# Patient Record
Sex: MALE | Race: WHITE | NOT HISPANIC OR LATINO | Employment: STUDENT | URBAN - METROPOLITAN AREA
[De-identification: names, ages, dates, MRNs, and addresses within clinical notes are randomized per-mention and may not be internally consistent; named-entity substitution may affect disease eponyms.]

---

## 2017-07-12 ENCOUNTER — LAB VISIT (OUTPATIENT)
Dept: LAB | Facility: HOSPITAL | Age: 22
End: 2017-07-12
Payer: OTHER GOVERNMENT

## 2017-07-12 ENCOUNTER — OFFICE VISIT (OUTPATIENT)
Dept: INTERNAL MEDICINE | Facility: CLINIC | Age: 22
End: 2017-07-12
Payer: OTHER GOVERNMENT

## 2017-07-12 VITALS
WEIGHT: 142.63 LBS | HEIGHT: 68 IN | DIASTOLIC BLOOD PRESSURE: 61 MMHG | HEART RATE: 60 BPM | SYSTOLIC BLOOD PRESSURE: 118 MMHG | BODY MASS INDEX: 21.62 KG/M2 | TEMPERATURE: 99 F

## 2017-07-12 DIAGNOSIS — Z11.3 SCREEN FOR STD (SEXUALLY TRANSMITTED DISEASE): ICD-10-CM

## 2017-07-12 DIAGNOSIS — Z11.3 SCREEN FOR STD (SEXUALLY TRANSMITTED DISEASE): Primary | ICD-10-CM

## 2017-07-12 PROCEDURE — 86703 HIV-1/HIV-2 1 RESULT ANTBDY: CPT

## 2017-07-12 PROCEDURE — 99213 OFFICE O/P EST LOW 20 MIN: CPT | Mod: PBBFAC | Performed by: STUDENT IN AN ORGANIZED HEALTH CARE EDUCATION/TRAINING PROGRAM

## 2017-07-12 PROCEDURE — 99214 OFFICE O/P EST MOD 30 MIN: CPT | Mod: S$PBB,,, | Performed by: STUDENT IN AN ORGANIZED HEALTH CARE EDUCATION/TRAINING PROGRAM

## 2017-07-12 PROCEDURE — 86592 SYPHILIS TEST NON-TREP QUAL: CPT

## 2017-07-12 PROCEDURE — 36415 COLL VENOUS BLD VENIPUNCTURE: CPT

## 2017-07-12 PROCEDURE — 99999 PR PBB SHADOW E&M-EST. PATIENT-LVL III: CPT | Mod: PBBFAC,,, | Performed by: STUDENT IN AN ORGANIZED HEALTH CARE EDUCATION/TRAINING PROGRAM

## 2017-07-12 PROCEDURE — 87591 N.GONORRHOEAE DNA AMP PROB: CPT

## 2017-07-12 NOTE — PROGRESS NOTES
Subjective:       Patient ID: Manuel Sanchez is a 22 y.o. male.    Chief Complaint: Establish Care    22M presents for annual visit. He requests STD screening. He reports 4-5 female partners in the past year. Otherwise denies complaints. Reports intermittent urethritis.    Reports binge drinking 4-6 drinks on weekends. Completing a degree in Construction Management at [employer]. 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      Review of Systems   Constitutional: Negative for chills, fatigue and fever.   HENT: Negative for congestion and rhinorrhea.    Eyes: Negative for pain and redness.   Respiratory: Negative for cough, chest tightness, shortness of breath and wheezing.    Cardiovascular: Negative for chest pain, palpitations and leg swelling.   Gastrointestinal: Negative for abdominal pain, constipation, diarrhea, nausea and vomiting.   Endocrine: Negative for cold intolerance, heat intolerance, polydipsia and polyuria.   Genitourinary: Positive for dysuria. Negative for hematuria.   Musculoskeletal: Negative for arthralgias and myalgias.   Allergic/Immunologic: Negative for environmental allergies, food allergies and immunocompromised state.   Neurological: Negative for dizziness, light-headedness and headaches.   Hematological: Negative for adenopathy. Does not bruise/bleed easily.   Psychiatric/Behavioral: Negative for agitation and confusion.       Objective:      Physical Exam   Constitutional: He is oriented to person, place, and time. He appears well-developed and well-nourished. No distress.   HENT:   Head: Normocephalic and atraumatic.   Eyes: EOM are normal. Pupils are equal, round, and reactive to light.   Neck: Normal range of motion. Neck supple.   Cardiovascular: Normal rate, regular rhythm, normal heart sounds and intact distal pulses.  Exam reveals no gallop and no friction rub.    No murmur heard.  Pulmonary/Chest: Effort normal and breath sounds normal. No respiratory distress. He  exhibits no tenderness.   Abdominal: Soft. Bowel sounds are normal. He exhibits no distension. There is no tenderness.   Musculoskeletal: Normal range of motion. He exhibits no edema, tenderness or deformity.   Neurological: He is alert and oriented to person, place, and time. No cranial nerve deficit. Coordination normal.   Skin: Skin is warm and dry. No rash noted. He is not diaphoretic. No erythema. No pallor.   Psychiatric: He has a normal mood and affect. His behavior is normal. Judgment and thought content normal.   Nursing note and vitals reviewed.      Assessment:       1. Screen for STD (sexually transmitted disease)        Plan:       22M presents to establish care. Requests evaluation for STI.  - PMH, PSH, Allergies, Medications, , FH reviewed and updated in medical record.   - Healthcare maintenance brought up to date as detailed below. Screened for alcohol dependence and depression.  - Blood, urine screened for STDs as detailed below.    Manuel was seen today for establish care.    Diagnoses and all orders for this visit:    Screen for STD (sexually transmitted disease)  -     HIV-1 and HIV-2 antibodies; Future  -     RPR; Future  -     C. trachomatis/N. gonorrhoeae by AMP DNA Urine

## 2017-07-13 ENCOUNTER — TELEPHONE (OUTPATIENT)
Dept: INTERNAL MEDICINE | Facility: CLINIC | Age: 22
End: 2017-07-13

## 2017-07-13 LAB
C TRACH DNA SPEC QL NAA+PROBE: NOT DETECTED
HIV 1+2 AB+HIV1 P24 AG SERPL QL IA: NEGATIVE
N GONORRHOEA DNA SPEC QL NAA+PROBE: NOT DETECTED
RPR SER QL: NORMAL

## 2017-07-13 NOTE — PROGRESS NOTES
I have personally interviewed and examined this patient and agree with resident's note as below.   Sierra Bustos MD

## 2017-07-13 NOTE — TELEPHONE ENCOUNTER
Called home number [phone]. His mother answered and referred me to his mobile number [phone]. I called and left a voicemail informing him of his test results.

## 2018-06-03 ENCOUNTER — OFFICE VISIT (OUTPATIENT)
Dept: URGENT CARE | Facility: CLINIC | Age: 23
End: 2018-06-03
Payer: COMMERCIAL

## 2018-06-03 VITALS
HEART RATE: 86 BPM | WEIGHT: 125 LBS | SYSTOLIC BLOOD PRESSURE: 128 MMHG | OXYGEN SATURATION: 96 % | TEMPERATURE: 98 F | RESPIRATION RATE: 16 BRPM | BODY MASS INDEX: 19.62 KG/M2 | HEIGHT: 67 IN | DIASTOLIC BLOOD PRESSURE: 79 MMHG

## 2018-06-03 DIAGNOSIS — L73.9 FOLLICULITIS OF RIGHT AXILLA: ICD-10-CM

## 2018-06-03 DIAGNOSIS — L02.413 ABSCESS OF RIGHT ARM: Primary | ICD-10-CM

## 2018-06-03 PROCEDURE — 96372 THER/PROPH/DIAG INJ SC/IM: CPT | Mod: S$GLB,,, | Performed by: FAMILY MEDICINE

## 2018-06-03 PROCEDURE — 10060 I&D ABSCESS SIMPLE/SINGLE: CPT | Mod: S$GLB,,, | Performed by: FAMILY MEDICINE

## 2018-06-03 PROCEDURE — 99213 OFFICE O/P EST LOW 20 MIN: CPT | Mod: 25,S$GLB,, | Performed by: FAMILY MEDICINE

## 2018-06-03 RX ORDER — SULFAMETHOXAZOLE AND TRIMETHOPRIM 800; 160 MG/1; MG/1
1 TABLET ORAL 2 TIMES DAILY
Qty: 20 TABLET | Refills: 0 | Status: SHIPPED | OUTPATIENT
Start: 2018-06-03 | End: 2018-06-13

## 2018-06-03 RX ORDER — KETOROLAC TROMETHAMINE 10 MG/1
TABLET, FILM COATED ORAL
Qty: 20 TABLET | Refills: 0 | Status: SHIPPED | OUTPATIENT
Start: 2018-06-03 | End: 2018-06-13

## 2018-06-03 RX ORDER — KETOROLAC TROMETHAMINE 30 MG/ML
60 INJECTION, SOLUTION INTRAMUSCULAR; INTRAVENOUS
Status: COMPLETED | OUTPATIENT
Start: 2018-06-03 | End: 2018-06-03

## 2018-06-03 RX ADMIN — KETOROLAC TROMETHAMINE 60 MG: 30 INJECTION, SOLUTION INTRAMUSCULAR; INTRAVENOUS at 11:06

## 2018-06-03 NOTE — PROGRESS NOTES
"Subjective:       Patient ID: Manuel Sanchez is a 23 y.o. male.    Vitals:  height is 5' 7" (1.702 m) and weight is 56.7 kg (125 lb). His temperature is 97.5 °F (36.4 °C). His blood pressure is 128/79 and his pulse is 86. His respiration is 16 and oxygen saturation is 96%.     Chief Complaint: Abscess (POSSIBLE AXILLARY ABSCESS - LT SIDE)    C/o pain and swelling in right axilla  X 2 weeks, no drainage, no new deodorant        Abscess   Chronicity:  NewProgression Since Onset: worsening  Location:  Shoulder/arm  Associated Symptoms: no fever, no chills  Characteristics: painful and redness    Pain Scale:  9/10  Treatments Tried:  Nothing  Relieved by:  Nothing  Worsened by:  Nothing    Review of Systems   Constitution: Negative for chills and fever.   Eyes: Negative for discharge.   Skin: Negative for flushing and rash.        LT AXILLARY ABSCESS   Musculoskeletal: Negative for back pain.   Gastrointestinal: Negative for nausea and vomiting.   Genitourinary: Negative for dysuria, genital sores, hematuria and urgency.       Objective:      Physical Exam   Constitutional: He is oriented to person, place, and time. He appears well-developed and well-nourished. He is cooperative.  Non-toxic appearance. He does not appear ill. No distress.   HENT:   Head: Normocephalic and atraumatic.   Right Ear: Hearing, tympanic membrane, external ear and ear canal normal.   Left Ear: Hearing, tympanic membrane, external ear and ear canal normal.   Nose: Nose normal. No mucosal edema, rhinorrhea or nasal deformity. No epistaxis. Right sinus exhibits no maxillary sinus tenderness and no frontal sinus tenderness. Left sinus exhibits no maxillary sinus tenderness and no frontal sinus tenderness.   Mouth/Throat: Uvula is midline, oropharynx is clear and moist and mucous membranes are normal. No trismus in the jaw. Normal dentition. No uvula swelling. No oropharyngeal exudate or posterior oropharyngeal erythema.   Eyes: Conjunctivae and " lids are normal. Right eye exhibits no discharge. Left eye exhibits no discharge. No scleral icterus.   Sclera clear bilat   Neck: Trachea normal, normal range of motion, full passive range of motion without pain and phonation normal. Neck supple.   Cardiovascular: Normal rate, regular rhythm, normal heart sounds, intact distal pulses and normal pulses.    No murmur heard.  Pulmonary/Chest: Effort normal and breath sounds normal. He has no wheezes.   Abdominal: Soft. Normal appearance and bowel sounds are normal. He exhibits no distension, no pulsatile midline mass and no mass. There is no tenderness.   Musculoskeletal: Normal range of motion. He exhibits no edema or deformity.   Right axilla  With multiple tender abscess areas, no other LAP, raising of right arm with increase pain   Lymphadenopathy:     He has no cervical adenopathy.   Neurological: He is alert and oriented to person, place, and time. He exhibits normal muscle tone. Coordination normal.   Skin: Skin is warm, dry and intact. He is not diaphoretic. No pallor.   Psychiatric: He has a normal mood and affect. His speech is normal and behavior is normal. Judgment and thought content normal. Cognition and memory are normal.   Nursing note and vitals reviewed.      Assessment:       1. Abscess of right arm    2. Folliculitis of right axilla        Plan:         Abscess of right arm  -     sulfamethoxazole-trimethoprim 800-160mg (BACTRIM DS) 800-160 mg Tab; Take 1 tablet by mouth 2 (two) times daily.  Dispense: 20 tablet; Refill: 0  -     ketorolac injection 60 mg; Inject 2 mLs (60 mg total) into the muscle one time.  -     ketorolac (TORADOL) 10 mg tablet; Take one po q 8 hrs prn pain  Dispense: 20 tablet; Refill: 0  -     Ambulatory referral to General Surgery  -     INCISION AND DRAINAGE    Folliculitis of right axilla        Apply warm compresses,   Follow up with surgeon

## 2018-06-03 NOTE — PROCEDURES
"Incision & Drainage  Date/Time: 6/3/2018 11:05 AM  Performed by: ORALIA WEAVER  Authorized by: ORALIA WEAVER     Time out: Immediately prior to procedure a "time out" was called to verify the correct patient, procedure, equipment, support staff and site/side marked as required.    Consent Done?:  Yes (Verbal)    Type:  Abscess  Body area:  Upper extremity  Local anesthetic:  Lidocaine 1% without epinephrinelidocaine 1% without epinephrine  Anesthetic total (ml):  20  Risk factor:  Underlying major nerve  Incision type:  Single with marsupialization  Complexity:  Complex  Drainage:  Serosanguinous, pus and bloody  Drainage amount:  Scant  Wound treatment:  Incision, wound packed and deloculation  Packing material:  1/2 in gauze  Patient tolerance:  Patient tolerated the procedure well with no immediate complications      "

## 2018-06-05 ENCOUNTER — CLINICAL SUPPORT (OUTPATIENT)
Dept: URGENT CARE | Facility: CLINIC | Age: 23
End: 2018-06-05
Payer: COMMERCIAL

## 2018-06-05 VITALS
RESPIRATION RATE: 16 BRPM | TEMPERATURE: 98 F | HEART RATE: 67 BPM | SYSTOLIC BLOOD PRESSURE: 122 MMHG | OXYGEN SATURATION: 98 % | BODY MASS INDEX: 19.62 KG/M2 | WEIGHT: 125 LBS | HEIGHT: 67 IN | DIASTOLIC BLOOD PRESSURE: 78 MMHG

## 2018-06-05 DIAGNOSIS — Z51.89 ABSCESS RE-CHECK: Primary | ICD-10-CM

## 2018-06-05 PROCEDURE — 99499 UNLISTED E&M SERVICE: CPT | Mod: S$GLB,,, | Performed by: PHYSICIAN ASSISTANT

## 2018-06-05 NOTE — PROGRESS NOTES
"Subjective:       Patient ID: Manuel Sanchez is a 23 y.o. male.    Vitals:  height is 5' 7" (1.702 m) and weight is 56.7 kg (125 lb). His oral temperature is 98.3 °F (36.8 °C). His blood pressure is 122/78 and his pulse is 67. His respiration is 16 and oxygen saturation is 98%.     Chief Complaint: Wound Check (Abscess under left arm pit)    Patient states he is here for a wound check. Patient had an abscess under his left arm pit.      Wound Check   He was originally treated 2 to 3 days ago. Previous treatment included oral antibiotics and I&D of abscess. His temperature was unmeasured prior to arrival. The temperature was taken using an axillary reading. There has been no drainage from the wound. There is no redness present. There is no swelling present. The pain has not changed.     Review of Systems   Constitution: Negative for chills and fever.   HENT: Negative for sore throat.    Eyes: Negative for blurred vision.   Cardiovascular: Negative for chest pain.   Respiratory: Negative for shortness of breath.    Skin: Negative for rash.   Musculoskeletal: Negative for back pain and joint pain.   Gastrointestinal: Negative for abdominal pain, diarrhea, nausea and vomiting.   Neurological: Negative for headaches.   Psychiatric/Behavioral: The patient is not nervous/anxious.        Objective:      Physical Exam   Constitutional: He is oriented to person, place, and time. He appears well-developed and well-nourished.   HENT:   Head: Normocephalic and atraumatic. Head is without abrasion, without contusion and without laceration.   Right Ear: External ear normal.   Left Ear: External ear normal.   Nose: Nose normal.   Mouth/Throat: Oropharynx is clear and moist.   Eyes: Conjunctivae, EOM and lids are normal. Pupils are equal, round, and reactive to light.   Neck: Trachea normal, full passive range of motion without pain and phonation normal. Neck supple.   Cardiovascular: Normal rate, regular rhythm and normal heart " sounds.    Pulmonary/Chest: Effort normal and breath sounds normal. No stridor. No respiratory distress.   Musculoskeletal: Normal range of motion.   Neurological: He is alert and oriented to person, place, and time.   Skin: Skin is warm, dry and intact. Capillary refill takes less than 2 seconds. Lesion noted. No abrasion, no bruising, no burn, no ecchymosis, no laceration and no rash noted. No erythema.        Well healing abscess. Packing removed. No signs of active drainage. No residual edema or erythema.    Psychiatric: He has a normal mood and affect. His speech is normal and behavior is normal. Judgment and thought content normal. Cognition and memory are normal.   Nursing note and vitals reviewed.      Assessment:       1. Abscess re-check        Plan:         Abscess re-check        Wound Care After Packing Removal or Replacement  Packing is a special type of dressing placed inside a wound to help it heal. Once the packing is removed, you need to care for your wound. Good wound care helps prevent infection. Be sure to keep all follow-up appointments with your healthcare provider. Follow these instructions to take care of the wound once youre at home.  Home care  Your healthcare provider may prescribe medicines for pain. Or he or she may suggest an over-the-counter (OTC) pain reliever, such as ibuprofen or acetaminophen. Talk with your healthcare provider before taking any OTC medicines if you have chronic liver or kidney disease, a stomach ulcer, or gastrointestinal bleeding. In certain cases, you may also need to take antibiotics to help prevent infection. If so, take them exactly as directed for as long as directed. Dont stop taking your antibiotics until they are all gone, even if you feel better.  Here are some general care guidelines for your wound:  · Follow your healthcare providers instructions on how to care for your wound. Always wash your hands with soap and warm water before and after tending  to your wound.  · If a bandage was put on, remove and change it once a day or as directed. If the bandage gets wet or dirty, replace it as soon as possible with a new bandage. Use a clean cloth to gently pat the wound dry.  · If your packing was replaced, a small piece of gauze may hang from the wound. It allows fluid to continue draining from the wound. You may need to use an ointment or cream to keep the packing from sticking to the bandage.  · Don't bathe in a tub or soak your wound until your healthcare provider says its OK. Take showers or sponge baths instead. Avoid swimming.  · Dont scratch, rub, scrub, or pick your wound.  · Check your wound daily for the signs of infection listed below.  If your wound was closed, it was likely with one of four types of closures. These include stitches (sutures), strips of surgical tape, skin glue, and staples. Your healthcare provider will decide on the best closure based on the size and location of your wound. Each type of closure needs specific care.  · Sutures. You may want to clean the wound daily after the first 2 to 3 days. To do this, remove the bandage and gently wash the area with soap and warm water. After cleaning, apply a thin layer of antibiotic ointment if recommended. Then put on a new bandage. Sutures on the outside of the skin usually need to be removed by your healthcare provider.  · Surgical tape. Keep the area dry. If it gets wet, blot it dry with a towel. Surgical tape closures usually fall off within 7 to 10 days. If they have not fallen off after 10 days, you can remove them yourself. To remove the tape, use mineral oil or petroleum jelly on a cotton ball to gently rub the adhesive.  · Skin glue. You may shower or bathe as usual. But do not use soaps, lotions, or ointments on the wound area. Do not scrub the wound. After bathing, pat the wound dry with a soft towel. Do not apply liquids like peroxide, ointments, or creams to the wound while the  strips or film is in place. Don't scratch, rub, or pick at the strips or film. Don't put tape directly over the strips or film. Skin adhesive film will fall off naturally in 5 to 10 days. If it does not peel off in 10 days, gently rub petroleum jelly or an ointment onto the film.  · Staples. Take showers or sponge baths. Don't take tub baths. Don't use lotions on the wound area. The area may be cleaned with soap and water 2 to 3 days after the wound was stapled. Don't scrub the wound. Pat it dry with a clean soft cloth or towel. You can use antibiotic ointment if your healthcare provider tells you to. Staples will need to be removed in 10 to 14 days.  Follow-up care  Follow up with your healthcare provider, or as directed. If your packing was replaced, you may need another visit within 1 to 3 days to remove or replace it. If you have sutures or staples, return for their removal as directed.  When to seek medical advice  Call your health care provider right away if you have signs of infection:  · Fever of 1 degree or more above your normal temperature, or as directed by your healthcare provider  · Increasing pain in the wound or pain that doesnt get better even with pain medicine  · Increasing redness or swelling  · Pus or bad-smelling drainage from the wound  Also call your healthcare provider right away if any of these occur:  · Your wound bleeds more than a small amount or wont stop bleeding.  · The edges of your wound come apart.  · You have numbness or weakness in the wound area that doesnt go away.  Date Last Reviewed: 10/2/2015  © 4595-3172 [employer]. [address] [place] [address]. All rights reserved. This information is not intended as a substitute for professional medical care. Always follow your healthcare professional's instructions.      Please follow up with your Primary care provider within 2-5 days if your signs and symptoms have not resolved or worsen.     If your  condition worsens or fails to improve we recommend that you receive another evaluation at the emergency room immediately or contact your primary medical clinic to discuss your concerns.   You must understand that you have received an Urgent Care treatment only and that you may be released before all of your medical problems are known or treated. You, the patient, will arrange for follow up care as instructed.

## 2018-06-05 NOTE — PATIENT INSTRUCTIONS
Wound Care After Packing Removal or Replacement  Packing is a special type of dressing placed inside a wound to help it heal. Once the packing is removed, you need to care for your wound. Good wound care helps prevent infection. Be sure to keep all follow-up appointments with your healthcare provider. Follow these instructions to take care of the wound once youre at home.  Home care  Your healthcare provider may prescribe medicines for pain. Or he or she may suggest an over-the-counter (OTC) pain reliever, such as ibuprofen or acetaminophen. Talk with your healthcare provider before taking any OTC medicines if you have chronic liver or kidney disease, a stomach ulcer, or gastrointestinal bleeding. In certain cases, you may also need to take antibiotics to help prevent infection. If so, take them exactly as directed for as long as directed. Dont stop taking your antibiotics until they are all gone, even if you feel better.  Here are some general care guidelines for your wound:  · Follow your healthcare providers instructions on how to care for your wound. Always wash your hands with soap and warm water before and after tending to your wound.  · If a bandage was put on, remove and change it once a day or as directed. If the bandage gets wet or dirty, replace it as soon as possible with a new bandage. Use a clean cloth to gently pat the wound dry.  · If your packing was replaced, a small piece of gauze may hang from the wound. It allows fluid to continue draining from the wound. You may need to use an ointment or cream to keep the packing from sticking to the bandage.  · Don't bathe in a tub or soak your wound until your healthcare provider says its OK. Take showers or sponge baths instead. Avoid swimming.  · Dont scratch, rub, scrub, or pick your wound.  · Check your wound daily for the signs of infection listed below.  If your wound was closed, it was likely with one of four types of closures. These include  stitches (sutures), strips of surgical tape, skin glue, and staples. Your healthcare provider will decide on the best closure based on the size and location of your wound. Each type of closure needs specific care.  · Sutures. You may want to clean the wound daily after the first 2 to 3 days. To do this, remove the bandage and gently wash the area with soap and warm water. After cleaning, apply a thin layer of antibiotic ointment if recommended. Then put on a new bandage. Sutures on the outside of the skin usually need to be removed by your healthcare provider.  · Surgical tape. Keep the area dry. If it gets wet, blot it dry with a towel. Surgical tape closures usually fall off within 7 to 10 days. If they have not fallen off after 10 days, you can remove them yourself. To remove the tape, use mineral oil or petroleum jelly on a cotton ball to gently rub the adhesive.  · Skin glue. You may shower or bathe as usual. But do not use soaps, lotions, or ointments on the wound area. Do not scrub the wound. After bathing, pat the wound dry with a soft towel. Do not apply liquids like peroxide, ointments, or creams to the wound while the strips or film is in place. Don't scratch, rub, or pick at the strips or film. Don't put tape directly over the strips or film. Skin adhesive film will fall off naturally in 5 to 10 days. If it does not peel off in 10 days, gently rub petroleum jelly or an ointment onto the film.  · Staples. Take showers or sponge baths. Don't take tub baths. Don't use lotions on the wound area. The area may be cleaned with soap and water 2 to 3 days after the wound was stapled. Don't scrub the wound. Pat it dry with a clean soft cloth or towel. You can use antibiotic ointment if your healthcare provider tells you to. Staples will need to be removed in 10 to 14 days.  Follow-up care  Follow up with your healthcare provider, or as directed. If your packing was replaced, you may need another visit within 1 to  3 days to remove or replace it. If you have sutures or staples, return for their removal as directed.  When to seek medical advice  Call your health care provider right away if you have signs of infection:  · Fever of 1 degree or more above your normal temperature, or as directed by your healthcare provider  · Increasing pain in the wound or pain that doesnt get better even with pain medicine  · Increasing redness or swelling  · Pus or bad-smelling drainage from the wound  Also call your healthcare provider right away if any of these occur:  · Your wound bleeds more than a small amount or wont stop bleeding.  · The edges of your wound come apart.  · You have numbness or weakness in the wound area that doesnt go away.  Date Last Reviewed: 10/2/2015  © 4969-6958 [employer]. [address] [place] [address], [place] [address]. All rights reserved. This information is not intended as a substitute for professional medical care. Always follow your healthcare professional's instructions.      Please follow up with your Primary care provider within 2-5 days if your signs and symptoms have not resolved or worsen.     If your condition worsens or fails to improve we recommend that you receive another evaluation at the emergency room immediately or contact your primary medical clinic to discuss your concerns.   You must understand that you have received an Urgent Care treatment only and that you may be released before all of your medical problems are known or treated. You, the patient, will arrange for follow up care as instructed.

## 2018-06-07 ENCOUNTER — OFFICE VISIT (OUTPATIENT)
Dept: SURGERY | Facility: CLINIC | Age: 23
End: 2018-06-07
Payer: COMMERCIAL

## 2018-06-07 VITALS
WEIGHT: 132.06 LBS | DIASTOLIC BLOOD PRESSURE: 73 MMHG | HEART RATE: 67 BPM | HEIGHT: 67 IN | SYSTOLIC BLOOD PRESSURE: 107 MMHG | BODY MASS INDEX: 20.73 KG/M2 | TEMPERATURE: 98 F

## 2018-06-07 DIAGNOSIS — L02.419 AXILLARY ABSCESS: ICD-10-CM

## 2018-06-07 PROCEDURE — 99999 PR PBB SHADOW E&M-EST. PATIENT-LVL III: CPT | Mod: PBBFAC,,, | Performed by: STUDENT IN AN ORGANIZED HEALTH CARE EDUCATION/TRAINING PROGRAM

## 2018-06-07 PROCEDURE — 3008F BODY MASS INDEX DOCD: CPT | Mod: CPTII,S$GLB,, | Performed by: STUDENT IN AN ORGANIZED HEALTH CARE EDUCATION/TRAINING PROGRAM

## 2018-06-07 PROCEDURE — 99204 OFFICE O/P NEW MOD 45 MIN: CPT | Mod: S$GLB,,, | Performed by: STUDENT IN AN ORGANIZED HEALTH CARE EDUCATION/TRAINING PROGRAM

## 2018-06-07 NOTE — LETTER
June 7, 2018      Julius Butts MD  [address] [place] [address]           [facility]  [address]  [address]th [address] [facility] [address]  Phone: [phone]          Patient: Manuel Sanchez   MR Number: [id number]   YOB: 1995   Date of Visit: 6/7/2018       Dear Dr. Julius Butts:    Thank you for referring Manuel Sanchez to me for evaluation. Attached you will find relevant portions of my assessment and plan of care.    If you have questions, please do not hesitate to call me. I look forward to following Manuel Sanchez along with you.    Sincerely,    Junior Nelson MD    Enclosure  CC:  No Recipients    If you would like to receive this communication electronically, please contact [email or web address] or [phone] to request more information on [employer] access.    For providers and/or their staff who would like to refer a patient to [facility], please contact us through our one-stop-shop provider referral line, [facility], at [phone].    If you feel you have received this communication in error or would no longer like to receive these types of communications, please e-mail [email or web address]

## 2018-06-07 NOTE — PROGRESS NOTES
Subjective:       Patient ID: Manuel Sanchez is a 23 y.o. male.    Chief Complaint: No chief complaint on file.    Patient is a 23M with a past medical history of left axilla cysts and folliculitis who presented to urgent care on 6/3 for presence of cysts with swelling, pain, and decreased range of motion. Urgent care performed excision and drainage of the cysts, placed the patient on bactrim, and referred him to general surgery for follow up care. The patient first noticed the irregularities about 3-4 weeks ago and they become increasingly inflamed and painful. The patient has no history of a similar condition and no fever, chill, or diaphoresis. The cysts are healing well are there is no discharge, or pain, and range of motion has returned.       Review of Systems   Constitutional: Negative.    HENT: Negative.    Eyes: Negative.    Respiratory: Negative.    Cardiovascular: Negative.    Gastrointestinal: Negative.    Endocrine: Negative.    Genitourinary: Negative.    Musculoskeletal: Negative.    Skin: Positive for color change.   Allergic/Immunologic: Negative.    Neurological: Negative.    Hematological: Negative.    Psychiatric/Behavioral: Negative.        Objective:      Physical Exam   Constitutional: He is oriented to person, place, and time. He appears well-developed and well-nourished.   HENT:   Head: Normocephalic and atraumatic.   Eyes: EOM are normal. Pupils are equal, round, and reactive to light.   Neck: Normal range of motion.   Cardiovascular: Normal rate and regular rhythm.    Pulmonary/Chest: Effort normal and breath sounds normal.   Abdominal: Soft.   Musculoskeletal: Normal range of motion.        Arms:  Neurological: He is alert and oriented to person, place, and time.   Skin: Skin is warm and dry.       Assessment:       No diagnosis found.    Plan:       Left axillary abscess s/p I&D  - patient is currently on bactrim   - follow up in one week once course of bactrim is completed   - if  swelling still present or if there is a return of symptoms then U/S can be performed

## 2018-06-13 ENCOUNTER — OFFICE VISIT (OUTPATIENT)
Dept: FAMILY MEDICINE | Facility: CLINIC | Age: 23
End: 2018-06-13
Payer: COMMERCIAL

## 2018-06-13 VITALS
BODY MASS INDEX: 21.35 KG/M2 | RESPIRATION RATE: 18 BRPM | HEIGHT: 67 IN | HEART RATE: 67 BPM | DIASTOLIC BLOOD PRESSURE: 62 MMHG | TEMPERATURE: 98 F | OXYGEN SATURATION: 99 % | SYSTOLIC BLOOD PRESSURE: 98 MMHG | WEIGHT: 136 LBS

## 2018-06-13 DIAGNOSIS — Z20.2 EXPOSURE TO STD: Primary | ICD-10-CM

## 2018-06-13 DIAGNOSIS — Z00.00 ANNUAL PHYSICAL EXAM: ICD-10-CM

## 2018-06-13 DIAGNOSIS — B35.3 TINEA PEDIS OF BOTH FEET: ICD-10-CM

## 2018-06-13 PROCEDURE — 99214 OFFICE O/P EST MOD 30 MIN: CPT | Mod: S$GLB,,, | Performed by: FAMILY MEDICINE

## 2018-06-13 PROCEDURE — 99999 PR PBB SHADOW E&M-EST. PATIENT-LVL IV: CPT | Mod: PBBFAC,,, | Performed by: FAMILY MEDICINE

## 2018-06-13 PROCEDURE — 3008F BODY MASS INDEX DOCD: CPT | Mod: CPTII,S$GLB,, | Performed by: FAMILY MEDICINE

## 2018-06-13 RX ORDER — SULCONAZOLE NITRATE 10 MG/G
CREAM TOPICAL DAILY
Qty: 60 G | Refills: 2 | Status: SHIPPED | OUTPATIENT
Start: 2018-06-13 | End: 2019-04-26

## 2018-06-13 NOTE — PROGRESS NOTES
HPI:  Manuel Sanchez is a 23 y.o. year old male that  Presents for annual exam . He has been having issues wit his feet for years.They itch and peel . He has tried over the counter foot fungus cream but only uses it twice a month. He wears boots daily.  Chief Complaint   Patient presents with    Annual Exam    Establish Care     pt would like to have STD testing     Athletes Foot   .     HPI      Past Medical History:   Diagnosis Date    Color blindness, congenital      Social History     Social History    Marital status: Single     Spouse name: N/A    Number of children: N/A    Years of education: N/A     Occupational History    Not on file.     Social History Main Topics    Smoking status: Former Smoker     Packs/day: 0.25     Types: Cigarettes     Start date: 9/5/2016     Quit date: 6/19/2017    Smokeless tobacco: Former User     Types: Chew    Alcohol use 2.4 - 3.6 oz/week     4 - 6 Glasses of wine per week    Drug use: No      Comment: former    Sexual activity: Yes     Partners: Female     Birth control/ protection: Condom, OCP     Other Topics Concern    Not on file     Social History Narrative    Pt lives with mother and sister.        Pt attends [employer], 12th grade.        2 cats     Past Surgical History:   Procedure Laterality Date    ORIF FOREARM FRACTURE Right 2010    R subacromial smoothing Right 2012    TONSILLECTOMY Bilateral 2001     Family History   Problem Relation Age of Onset    Blindness Maternal Uncle     Cancer Maternal Grandmother     Celiac disease Mother     Diabetes Father     Celiac disease Sister     Amblyopia Neg Hx     Cataracts Neg Hx     Glaucoma Neg Hx     Macular degeneration Neg Hx     Retinal detachment Neg Hx     Strabismus Neg Hx            Review of Systems  General ROS: negative for chills, fever or weight loss  ENT ROS: negative for epistaxis, sore throat or rhinorrhea  Respiratory ROS: no cough, shortness of breath, or  "wheezing  Cardiovascular ROS: no chest pain or dyspnea on exertion  Gastrointestinal ROS: no abdominal pain, change in bowel habits, or black/ bloody stools  Skin: skin peeling on feet    Physical Exam:  BP 98/62   Pulse 67   Temp 97.6 °F (36.4 °C) (Oral)   Resp 18   Ht 5' 7" (1.702 m)   Wt 61.7 kg (136 lb)   SpO2 99%   BMI 21.30 kg/m²   General appearance: alert, cooperative, no distress  Constitutional:Oriented to person, place, and time.appears well-developed and well-nourished.  Skin: bilateral feet, peel ing on dorsum , no open lesions   Physical Exam    LABS:    Complete Blood Count  Lab Results   Component Value Date    RBC 5.04 07/16/2012    HGB 15.4 07/16/2012    HCT 45.3 07/16/2012    MCV 89.9 07/16/2012    MCH 30.4 07/16/2012    MCHC 33.8 07/16/2012    RDW 12.2 07/16/2012     07/16/2012    MPV 8.4 (L) 07/16/2012    GRAN 2.9 07/16/2012    GRAN 49.8 07/16/2012    LYMPH 37.0 07/16/2012    LYMPH 2.1 07/16/2012    MONO 11.2 (H) 07/16/2012    MONO 0.6 07/16/2012    EOS 0.1 07/16/2012    BASO 0.0 07/16/2012    EOSINOPHIL 1.4 07/16/2012    BASOPHIL 0.6 07/16/2012       Comprehensive Metabolic Panel  No results found for: GLU, BUN, CREATININE, NA, K, CL, PROT, ALBUMIN, BILITOT, AST, ALKPHOS, CO2, ALT, ANIONGAP, EGFRNONAA, ESTGFRAFRICA    LIPID  Lab Results   Component Value Date    CHOL 141 03/23/2009    HDL 45 03/23/2009         TSH  No results found for: TSH    Current Outpatient Prescriptions   Medication Sig Dispense Refill    sulfamethoxazole-trimethoprim 800-160mg (BACTRIM DS) 800-160 mg Tab Take 1 tablet by mouth 2 (two) times daily. 20 tablet 0    sulconazole (EXELDERM) 1 % cream Apply topically once daily. 60 g 2     No current facility-administered medications for this visit.        Assessment:    ICD-10-CM ICD-9-CM    1. Exposure to STD Z20.2 V01.6 Hepatitis panel, acute      HIV-1 and HIV-2 antibodies      RPR      C. trachomatis/N. gonorrhoeae by AMP DNA Urine         2. Tinea pedis of " both feet B35.3 110.4 sulconazole (EXELDERM) 1 % cream   3. Annual physical exam Z00.00 V70.0 Comprehensive metabolic panel      Lipid panel      CBC auto differential      TSH         Plan:    Follow-up in 4 weeks (on 7/10/2018).          Carmen Felton MD

## 2018-06-14 ENCOUNTER — TELEPHONE (OUTPATIENT)
Dept: SURGERY | Facility: CLINIC | Age: 23
End: 2018-06-14

## 2018-06-14 NOTE — TELEPHONE ENCOUNTER
06/14/2018      1122  Attempted to contact pt regarding rescheduling his appt on tomorrow due to Dr. Nelson having to go to surgery. No answer. Left voicemail.

## 2018-06-23 ENCOUNTER — LAB VISIT (OUTPATIENT)
Dept: LAB | Facility: HOSPITAL | Age: 23
End: 2018-06-23
Attending: FAMILY MEDICINE
Payer: COMMERCIAL

## 2018-06-23 DIAGNOSIS — Z20.2 EXPOSURE TO STD: ICD-10-CM

## 2018-06-23 LAB
C TRACH DNA SPEC QL NAA+PROBE: NOT DETECTED
N GONORRHOEA DNA SPEC QL NAA+PROBE: NOT DETECTED

## 2018-06-23 PROCEDURE — 87491 CHLMYD TRACH DNA AMP PROBE: CPT

## 2019-04-26 ENCOUNTER — OFFICE VISIT (OUTPATIENT)
Dept: URGENT CARE | Facility: CLINIC | Age: 24
End: 2019-04-26
Payer: COMMERCIAL

## 2019-04-26 VITALS
TEMPERATURE: 98 F | BODY MASS INDEX: 19.62 KG/M2 | WEIGHT: 125 LBS | SYSTOLIC BLOOD PRESSURE: 103 MMHG | DIASTOLIC BLOOD PRESSURE: 64 MMHG | OXYGEN SATURATION: 99 % | HEART RATE: 62 BPM | HEIGHT: 67 IN | RESPIRATION RATE: 18 BRPM

## 2019-04-26 DIAGNOSIS — B36.0 TINEA VERSICOLOR: Primary | ICD-10-CM

## 2019-04-26 PROCEDURE — 3008F BODY MASS INDEX DOCD: CPT | Mod: CPTII,S$GLB,, | Performed by: PHYSICIAN ASSISTANT

## 2019-04-26 PROCEDURE — 99214 PR OFFICE/OUTPT VISIT, EST, LEVL IV, 30-39 MIN: ICD-10-PCS | Mod: S$GLB,,, | Performed by: PHYSICIAN ASSISTANT

## 2019-04-26 PROCEDURE — 99214 OFFICE O/P EST MOD 30 MIN: CPT | Mod: S$GLB,,, | Performed by: PHYSICIAN ASSISTANT

## 2019-04-26 PROCEDURE — 3008F PR BODY MASS INDEX (BMI) DOCUMENTED: ICD-10-PCS | Mod: CPTII,S$GLB,, | Performed by: PHYSICIAN ASSISTANT

## 2019-04-26 RX ORDER — KETOCONAZOLE 20 MG/ML
SHAMPOO, SUSPENSION TOPICAL
Qty: 120 ML | Refills: 0 | Status: SHIPPED | OUTPATIENT
Start: 2019-04-29 | End: 2019-05-13

## 2019-04-26 RX ORDER — CLOTRIMAZOLE 1 %
CREAM (GRAM) TOPICAL
Qty: 30 G | Refills: 2 | Status: SHIPPED | OUTPATIENT
Start: 2019-04-26 | End: 2019-05-29

## 2019-04-26 NOTE — PATIENT INSTRUCTIONS
-Wash body with antifungal shampoo twice weekly for 2 weeks.  -Apply antifungal cream twice daily for 2 weeks.    Please follow up with your primary care provider within 2-5 days if your signs and symptoms have not resolved or worsen.     If your condition worsens or fails to improve we recommend that you receive another evaluation at the emergency room immediately or contact your primary medical clinic to discuss your concerns.   You must understand that you have received an Urgent Care treatment only and that you may be released before all of your medical problems are known or treated. You, the patient, will arrange for follow up care as instructed.         Tinea Versicolor  This is a rash caused by a fungus in the top layers of the skin. This fungus is normally present in the pores of the skin and causes no symptoms. But when the fungus overgrows it causes a rash. The fungus grows more easily in hot climates, and on oily or sweaty skin. Health experts dont know why some people get this rash and others dont. Experts also dont know why the rash will suddenly appear in someone who has never had it before.  The rash is made up of irregular pale or tan spots and patches. The rash is usually on the neck, upper back, chest, and shoulders. You may have mild itching, especially if you become overheated. But it doesn't cause other symptoms. Because these spots don't change color with sun exposure like normal skin, the rash may be lighter or darker than your normal skin.  This rash is harmless and usually causes no symptoms. The only reason for treatment is to improve appearance. Follow the advice below to clear the rash. It might take several months for normal skin color to return.  Home care  · Use a medicated dandruff shampoo over your whole body while in the shower. Dont use soap. Let the shampoo stay on for at least a few minutes before rinsing off. Do this every day for 4 weeks.  · As a different treatment, you  may buy an antifungal cream (miconazole or clotrimazole, both available without a prescription). Use this 2 times a day for 7 days.   · This rash is not contagious to others. It cant be spread if someone touches it. So you dont have to worry about exposing others at school, , or work.  Prevention  This fungus can come back again (recur) after treatment. To prevent return of the rash, use medicated dandruff shampoo over your whole body when in the shower. Do this once a month for the next year. This is very important to do in the summertime. That is when the rash is most likely to recur.  Other prevention tips include:  · Avoid oily skin products  · Wear loose clothing. Try to let your skin stay cool and breathe.  · Use sunscreen and protect yourself from sunlight  · Avoid tanning beds  Follow-up care  Follow up with your healthcare provider, or as advised. Call your provider if the rash doesnt get better with the above treatment, or if new symptoms appear.  When to seek medical advice  Call your healthcare provider right away if any of these occur:  · Increasing redness of the rash  · Change in appearance of the rash  · Fever of 100.4ºF (38ºC) or higher, or as directed by your provider  Date Last Reviewed: 8/1/2016  © 6321-5280 [employer]. [address] [place] [address], [place] [address]. All rights reserved. This information is not intended as a substitute for professional medical care. Always follow your healthcare professional's instructions.

## 2019-04-26 NOTE — PROGRESS NOTES
"Subjective:       Patient ID: Manuel Sanchez is a 24 y.o. male.    Vitals:  height is 5' 7" (1.702 m) and weight is 56.7 kg (125 lb). His oral temperature is 98.1 °F (36.7 °C). His blood pressure is 103/64 and his pulse is 62. His respiration is 18 and oxygen saturation is 99%.     Chief Complaint: Rash    Rash   This is a new problem. Episode onset: 2 weeks. The problem has been gradually worsening since onset. The affected locations include the chest. Pertinent negatives include no cough, fever or sore throat. Past treatments include nothing.       Constitution: Negative for chills and fever.   HENT: Negative for facial swelling and sore throat.    Neck: Negative for painful lymph nodes.   Eyes: Negative for eye itching and eyelid swelling.   Respiratory: Negative for cough.    Musculoskeletal: Negative for joint pain and joint swelling.   Skin: Positive for rash and erythema. Negative for color change, pale, wound, abrasion, laceration, lesion, skin thickening/induration, puncture wound, abscess, avulsion and hives.   Allergic/Immunologic: Negative for environmental allergies, immunocompromised state and hives.   Hematologic/Lymphatic: Negative for swollen lymph nodes.       Objective:      Physical Exam   Constitutional: He is oriented to person, place, and time. Vital signs are normal. He appears well-developed and well-nourished. He does not appear ill. No distress.   HENT:   Head: Normocephalic and atraumatic.   Right Ear: External ear normal.   Left Ear: External ear normal.   Nose: Nose normal.   Eyes: Conjunctivae, EOM and lids are normal. Right eye exhibits no discharge. Left eye exhibits no discharge.   Neck: Normal range of motion. Neck supple.   Cardiovascular: Normal rate, regular rhythm and normal heart sounds. Exam reveals no gallop and no friction rub.   No murmur heard.  Pulmonary/Chest: Effort normal and breath sounds normal. No stridor. No respiratory distress. He has no decreased breath " sounds. He has no wheezes. He has no rhonchi. He has no rales.   Musculoskeletal: Normal range of motion.   Neurological: He is alert and oriented to person, place, and time.   Skin: Skin is warm and dry. Rash noted. He is not diaphoretic. There is erythema. No pallor.   Erythematous annular patches on chest with central clearing; hypopigmented areas of upper back   Psychiatric: He has a normal mood and affect. His behavior is normal.   Nursing note and vitals reviewed.                Assessment:       1. Tinea versicolor        Plan:         Tinea versicolor  -     clotrimazole (LOTRIMIN) 1 % cream; Apply to affected area 2 times daily for 2 weeks.  Dispense: 30 g; Refill: 2  -     ketoconazole (NIZORAL) 2 % shampoo; Apply topically twice a week. for 14 days  Dispense: 120 mL; Refill: 0      Patient Instructions   -Wash body with antifungal shampoo twice weekly for 2 weeks.  -Apply antifungal cream twice daily for 2 weeks.    Please follow up with your primary care provider within 2-5 days if your signs and symptoms have not resolved or worsen.     If your condition worsens or fails to improve we recommend that you receive another evaluation at the emergency room immediately or contact your primary medical clinic to discuss your concerns.   You must understand that you have received an Urgent Care treatment only and that you may be released before all of your medical problems are known or treated. You, the patient, will arrange for follow up care as instructed.         Tinea Versicolor  This is a rash caused by a fungus in the top layers of the skin. This fungus is normally present in the pores of the skin and causes no symptoms. But when the fungus overgrows it causes a rash. The fungus grows more easily in hot climates, and on oily or sweaty skin. Health experts dont know why some people get this rash and others dont. Experts also dont know why the rash will suddenly appear in someone who has never had it  before.  The rash is made up of irregular pale or tan spots and patches. The rash is usually on the neck, upper back, chest, and shoulders. You may have mild itching, especially if you become overheated. But it doesn't cause other symptoms. Because these spots don't change color with sun exposure like normal skin, the rash may be lighter or darker than your normal skin.  This rash is harmless and usually causes no symptoms. The only reason for treatment is to improve appearance. Follow the advice below to clear the rash. It might take several months for normal skin color to return.  Home care  · Use a medicated dandruff shampoo over your whole body while in the shower. Dont use soap. Let the shampoo stay on for at least a few minutes before rinsing off. Do this every day for 4 weeks.  · As a different treatment, you may buy an antifungal cream (miconazole or clotrimazole, both available without a prescription). Use this 2 times a day for 7 days.   · This rash is not contagious to others. It cant be spread if someone touches it. So you dont have to worry about exposing others at school, , or work.  Prevention  This fungus can come back again (recur) after treatment. To prevent return of the rash, use medicated dandruff shampoo over your whole body when in the shower. Do this once a month for the next year. This is very important to do in the summertime. That is when the rash is most likely to recur.  Other prevention tips include:  · Avoid oily skin products  · Wear loose clothing. Try to let your skin stay cool and breathe.  · Use sunscreen and protect yourself from sunlight  · Avoid tanning beds  Follow-up care  Follow up with your healthcare provider, or as advised. Call your provider if the rash doesnt get better with the above treatment, or if new symptoms appear.  When to seek medical advice  Call your healthcare provider right away if any of these occur:  · Increasing redness of the rash  · Change  in appearance of the rash  · Fever of 100.4ºF (38ºC) or higher, or as directed by your provider  Date Last Reviewed: 8/1/2016  © 9811-9920 [employer]. [address] [place] [address], [place] [address]. All rights reserved. This information is not intended as a substitute for professional medical care. Always follow your healthcare professional's instructions.

## 2019-05-20 ENCOUNTER — OFFICE VISIT (OUTPATIENT)
Dept: OPTOMETRY | Facility: CLINIC | Age: 24
End: 2019-05-20
Payer: COMMERCIAL

## 2019-05-20 DIAGNOSIS — H52.7 EYE REFRACTION DISORDER: Primary | ICD-10-CM

## 2019-05-20 PROCEDURE — 92014 PR EYE EXAM, EST PATIENT,COMPREHESV: ICD-10-PCS | Mod: S$GLB,,, | Performed by: OPTOMETRIST

## 2019-05-20 PROCEDURE — 99999 PR PBB SHADOW E&M-EST. PATIENT-LVL II: CPT | Mod: PBBFAC,,, | Performed by: OPTOMETRIST

## 2019-05-20 PROCEDURE — 92015 DETERMINE REFRACTIVE STATE: CPT | Mod: S$GLB,,, | Performed by: OPTOMETRIST

## 2019-05-20 PROCEDURE — 92014 COMPRE OPH EXAM EST PT 1/>: CPT | Mod: S$GLB,,, | Performed by: OPTOMETRIST

## 2019-05-20 PROCEDURE — 99999 PR PBB SHADOW E&M-EST. PATIENT-LVL II: ICD-10-PCS | Mod: PBBFAC,,, | Performed by: OPTOMETRIST

## 2019-05-20 PROCEDURE — 92015 PR REFRACTION: ICD-10-PCS | Mod: S$GLB,,, | Performed by: OPTOMETRIST

## 2019-05-20 NOTE — PROGRESS NOTES
HPI     Needs updated eyeglass rx  No eye pain  No eye drops    Last edited by Rupesh Medrano, OD on 5/20/2019 11:52 AM. (History)            Assessment /Plan     For exam results, see Encounter Report.    Eye refraction disorder - Both Eyes      1. New Spec Rx given. Different lens options discussed with patient. RTC 1 year full exam.

## 2019-05-29 ENCOUNTER — OFFICE VISIT (OUTPATIENT)
Dept: FAMILY MEDICINE | Facility: CLINIC | Age: 24
End: 2019-05-29
Payer: COMMERCIAL

## 2019-05-29 VITALS
WEIGHT: 130.81 LBS | BODY MASS INDEX: 20.53 KG/M2 | TEMPERATURE: 98 F | DIASTOLIC BLOOD PRESSURE: 78 MMHG | OXYGEN SATURATION: 98 % | HEIGHT: 67 IN | RESPIRATION RATE: 16 BRPM | SYSTOLIC BLOOD PRESSURE: 110 MMHG | HEART RATE: 78 BPM

## 2019-05-29 DIAGNOSIS — Z23 NEED FOR TDAP VACCINATION: ICD-10-CM

## 2019-05-29 DIAGNOSIS — M25.531 RIGHT WRIST PAIN: Primary | ICD-10-CM

## 2019-05-29 PROCEDURE — 90715 TDAP VACCINE GREATER THAN OR EQUAL TO 7YO IM: ICD-10-PCS | Mod: S$GLB,,, | Performed by: FAMILY MEDICINE

## 2019-05-29 PROCEDURE — 90471 IMMUNIZATION ADMIN: CPT | Mod: S$GLB,,, | Performed by: FAMILY MEDICINE

## 2019-05-29 PROCEDURE — 3008F BODY MASS INDEX DOCD: CPT | Mod: CPTII,S$GLB,, | Performed by: FAMILY MEDICINE

## 2019-05-29 PROCEDURE — 99999 PR PBB SHADOW E&M-EST. PATIENT-LVL IV: ICD-10-PCS | Mod: PBBFAC,,, | Performed by: FAMILY MEDICINE

## 2019-05-29 PROCEDURE — 99999 PR PBB SHADOW E&M-EST. PATIENT-LVL IV: CPT | Mod: PBBFAC,,, | Performed by: FAMILY MEDICINE

## 2019-05-29 PROCEDURE — 90471 TDAP VACCINE GREATER THAN OR EQUAL TO 7YO IM: ICD-10-PCS | Mod: S$GLB,,, | Performed by: FAMILY MEDICINE

## 2019-05-29 PROCEDURE — 90715 TDAP VACCINE 7 YRS/> IM: CPT | Mod: S$GLB,,, | Performed by: FAMILY MEDICINE

## 2019-05-29 PROCEDURE — 3008F PR BODY MASS INDEX (BMI) DOCUMENTED: ICD-10-PCS | Mod: CPTII,S$GLB,, | Performed by: FAMILY MEDICINE

## 2019-05-29 PROCEDURE — 99214 OFFICE O/P EST MOD 30 MIN: CPT | Mod: S$GLB,,, | Performed by: FAMILY MEDICINE

## 2019-05-29 PROCEDURE — 99214 PR OFFICE/OUTPT VISIT, EST, LEVL IV, 30-39 MIN: ICD-10-PCS | Mod: S$GLB,,, | Performed by: FAMILY MEDICINE

## 2019-06-02 NOTE — PROGRESS NOTES
HPI:  Manuel Sanchez is a 24 y.o. year old male that  presents with Right wrist pain when he puts pressure and flex the wrist. He is able o see a mass when hyper-extends the right wrist.  Chief Complaint   Patient presents with    Wrist Pain     patient report having right wrist for a couples weeks   .           Past Medical History:   Diagnosis Date    Color blindness, congenital      Social History     Socioeconomic History    Marital status: Single     Spouse name: Not on file    Number of children: Not on file    Years of education: Not on file    Highest education level: Not on file   Occupational History    Not on file   Social Needs    Financial resource strain: Not on file    Food insecurity:     Worry: Not on file     Inability: Not on file    Transportation needs:     Medical: Not on file     Non-medical: Not on file   Tobacco Use    Smoking status: Former Smoker     Packs/day: 0.25     Types: Cigarettes     Start date: 2016     Last attempt to quit: 2017     Years since quittin.9    Smokeless tobacco: Former User     Types: Chew   Substance and Sexual Activity    Alcohol use: Yes     Alcohol/week: 2.4 - 3.6 oz     Types: 4 - 6 Glasses of wine per week    Drug use: No     Types: Marijuana     Comment: former    Sexual activity: Yes     Partners: Female     Birth control/protection: Condom, OCP   Lifestyle    Physical activity:     Days per week: Not on file     Minutes per session: Not on file    Stress: Not on file   Relationships    Social connections:     Talks on phone: Not on file     Gets together: Not on file     Attends Congregational service: Not on file     Active member of club or organization: Not on file     Attends meetings of clubs or organizations: Not on file     Relationship status: Not on file   Other Topics Concern    Not on file   Social History Narrative    Pt lives with mother and sister.        Pt attends [employer], 12th grade.        2 cats  "    Past Surgical History:   Procedure Laterality Date    ORIF FOREARM FRACTURE Right 2010    R subacromial smoothing Right 2012    TONSILLECTOMY Bilateral 2001     Family History   Problem Relation Age of Onset    Blindness Maternal Uncle     Cancer Maternal Grandmother     Celiac disease Mother     Diabetes Father     Celiac disease Sister     No Known Problems Brother     No Known Problems Maternal Aunt     No Known Problems Paternal Aunt     No Known Problems Paternal Uncle     No Known Problems Maternal Grandfather     No Known Problems Paternal Grandmother     No Known Problems Paternal Grandfather     Amblyopia Neg Hx     Cataracts Neg Hx     Glaucoma Neg Hx     Macular degeneration Neg Hx     Retinal detachment Neg Hx     Strabismus Neg Hx     Hypertension Neg Hx     Stroke Neg Hx     Thyroid disease Neg Hx            Review of Systems  General ROS: negative for chills, fever or weight loss  ENT ROS: negative for epistaxis, sore throat or rhinorrhea  Respiratory ROS: no cough, shortness of breath, or wheezing  Cardiovascular ROS: no chest pain or dyspnea on exertion  Gastrointestinal ROS: no abdominal pain, change in bowel habits, or black/ bloody stools  Muscul: right wrist pain  Physical Exam:  /78 (BP Location: Right arm, Patient Position: Sitting, BP Method: Large (Manual))   Pulse 78   Temp 97.6 °F (36.4 °C) (Oral)   Resp 16   Ht 5' 7" (1.702 m)   Wt 59.4 kg (130 lb 13.5 oz)   SpO2 98%   BMI 20.49 kg/m²   General appearance: alert, cooperative, no distress  Constitutional:Oriented to person, place, and time.appears well-developed and well-nourished.  HEENT: Normocephalic, atraumatic, neck symmetrical, no nasal discharge, TM- clear bilaterally  Lungs: clear to auscultation bilaterally, no dullness to percussion bilaterally  Heart: regular rate and rhythm without rub; no displacement of  PMI , S1&S2 present  Muscul: right wrist pain with palpations.over the head of the " ulna.  Physical Exam      LABS:    Complete Blood Count  Lab Results   Component Value Date    RBC 4.65 06/23/2018    HGB 13.9 (L) 06/23/2018    HCT 42.3 06/23/2018    MCV 91 06/23/2018    MCH 29.9 06/23/2018    MCHC 32.9 06/23/2018    RDW 13.1 06/23/2018     06/23/2018    MPV 10.2 06/23/2018    GRAN 2.3 06/23/2018    GRAN 55.9 06/23/2018    LYMPH 1.4 06/23/2018    LYMPH 33.0 06/23/2018    MONO 0.4 06/23/2018    MONO 9.7 06/23/2018    EOS 0.0 06/23/2018    BASO 0.01 06/23/2018    EOSINOPHIL 1.0 06/23/2018    BASOPHIL 0.2 06/23/2018    DIFFMETHOD Automated 06/23/2018       Comprehensive Metabolic Panel  Lab Results   Component Value Date    GLU 85 06/23/2018    BUN 18 06/23/2018    CREATININE 1.1 06/23/2018     06/23/2018    K 4.5 06/23/2018     06/23/2018    PROT 7.4 06/23/2018    ALBUMIN 4.2 06/23/2018    BILITOT 1.0 06/23/2018    AST 52 (H) 06/23/2018    ALKPHOS 93 06/23/2018    CO2 28 06/23/2018    ALT 88 (H) 06/23/2018    ANIONGAP 7 (L) 06/23/2018    EGFRNONAA >60 06/23/2018    ESTGFRAFRICA >60 06/23/2018       LIPID  Lab Results   Component Value Date    CHOL 157 06/23/2018    HDL 57 06/23/2018         TSH  Lab Results   Component Value Date    TSH 2.115 06/23/2018       Current Outpatient Medications   Medication Sig Dispense Refill    ketoconazole (NIZORAL) 2 % shampoo Apply topically twice a week. for 14 days 120 mL 0     No current facility-administered medications for this visit.        Assessment:    ICD-10-CM ICD-9-CM    1. Right wrist pain M25.531 719.43 CANCELED: MRI Wrist Joint With Contrast Right   2. Need for Tdap vaccination Z23 V06.1 (In Office Administered) Tdap Vaccine         Plan:    Follow up in about 2 weeks (around 6/12/2019).          Carmen Felton MD

## 2019-06-03 ENCOUNTER — TELEPHONE (OUTPATIENT)
Dept: FAMILY MEDICINE | Facility: CLINIC | Age: 24
End: 2019-06-03

## 2019-06-03 DIAGNOSIS — M25.531 RIGHT WRIST PAIN: ICD-10-CM

## 2019-06-03 DIAGNOSIS — M67.431 GANGLION CYST OF WRIST, RIGHT: Primary | ICD-10-CM

## 2019-06-03 NOTE — TELEPHONE ENCOUNTER
Call patient advised him of message patient states she will like to discuss this with mom before before scheduling Orthopedics patient states he will call back.

## 2019-06-03 NOTE — TELEPHONE ENCOUNTER
----- Message from Carmen Felton MD sent at 6/3/2019  2:06 PM CDT -----  Please get this pt an appt with hand surgeon  For ganglion cyst on wrist. Call pt and let him know that his mri was positive for the cyst and he will need to have it removed. Order is in the chart.

## 2020-12-11 ENCOUNTER — OFFICE VISIT (OUTPATIENT)
Dept: OPTOMETRY | Facility: CLINIC | Age: 25
End: 2020-12-11
Payer: COMMERCIAL

## 2020-12-11 DIAGNOSIS — H52.203 MYOPIC ASTIGMATISM OF BOTH EYES: Primary | ICD-10-CM

## 2020-12-11 DIAGNOSIS — H52.13 MYOPIC ASTIGMATISM OF BOTH EYES: Primary | ICD-10-CM

## 2020-12-11 PROCEDURE — 92015 DETERMINE REFRACTIVE STATE: CPT | Mod: S$GLB,,, | Performed by: OPTOMETRIST

## 2020-12-11 PROCEDURE — 92014 COMPRE OPH EXAM EST PT 1/>: CPT | Mod: S$GLB,,, | Performed by: OPTOMETRIST

## 2020-12-11 PROCEDURE — 92014 PR EYE EXAM, EST PATIENT,COMPREHESV: ICD-10-PCS | Mod: S$GLB,,, | Performed by: OPTOMETRIST

## 2020-12-11 PROCEDURE — 92015 PR REFRACTION: ICD-10-PCS | Mod: S$GLB,,, | Performed by: OPTOMETRIST

## 2020-12-11 PROCEDURE — 99999 PR PBB SHADOW E&M-EST. PATIENT-LVL II: ICD-10-PCS | Mod: PBBFAC,,, | Performed by: OPTOMETRIST

## 2020-12-11 PROCEDURE — 99999 PR PBB SHADOW E&M-EST. PATIENT-LVL II: CPT | Mod: PBBFAC,,, | Performed by: OPTOMETRIST

## 2020-12-11 NOTE — PROGRESS NOTES
HPI     DLS 5/20/2019 with Dr. Medrano  Presents today for routine eye exam. Pt states noticeable VA changes. Need   an updated prescription to purchase new glasses.  No f/f  No gtts    Last edited by Jinny Beaver MA on 12/11/2020  8:58 AM. (History)        ROS     Negative for: Constitutional, Gastrointestinal, Neurological, Skin,   Genitourinary, Musculoskeletal, HENT, Endocrine, Cardiovascular, Eyes,   Respiratory, Psychiatric, Allergic/Imm, Heme/Lymph    Last edited by Yovanny Odom, OD on 12/11/2020  9:03 AM. (History)        Assessment /Plan     For exam results, see Encounter Report.    Myopic astigmatism of both eyes      Pt wishes new spex.  Wrote Rx    PLAN:    rtc 1 yr

## 2021-05-04 ENCOUNTER — IMMUNIZATION (OUTPATIENT)
Dept: PRIMARY CARE CLINIC | Facility: CLINIC | Age: 26
End: 2021-05-04

## 2021-05-04 DIAGNOSIS — Z23 NEED FOR VACCINATION: Primary | ICD-10-CM

## 2021-05-04 PROCEDURE — 0001A COVID-19, MRNA, LNP-S, PF, 30 MCG/0.3 ML DOSE VACCINE: CPT | Mod: CV19,S$GLB,, | Performed by: INTERNAL MEDICINE

## 2021-05-04 PROCEDURE — 0001A COVID-19, MRNA, LNP-S, PF, 30 MCG/0.3 ML DOSE VACCINE: ICD-10-PCS | Mod: CV19,S$GLB,, | Performed by: INTERNAL MEDICINE

## 2021-05-04 PROCEDURE — 91300 COVID-19, MRNA, LNP-S, PF, 30 MCG/0.3 ML DOSE VACCINE: CPT | Mod: S$GLB,,, | Performed by: INTERNAL MEDICINE

## 2021-05-04 PROCEDURE — 91300 COVID-19, MRNA, LNP-S, PF, 30 MCG/0.3 ML DOSE VACCINE: ICD-10-PCS | Mod: S$GLB,,, | Performed by: INTERNAL MEDICINE

## 2021-05-27 ENCOUNTER — IMMUNIZATION (OUTPATIENT)
Dept: INTERNAL MEDICINE | Facility: CLINIC | Age: 26
End: 2021-05-27
Payer: COMMERCIAL

## 2021-05-27 DIAGNOSIS — Z23 NEED FOR VACCINATION: Primary | ICD-10-CM

## 2021-05-27 PROCEDURE — 0002A COVID-19, MRNA, LNP-S, PF, 30 MCG/0.3 ML DOSE VACCINE: CPT | Mod: PBBFAC | Performed by: INTERNAL MEDICINE

## 2021-05-27 PROCEDURE — 91300 COVID-19, MRNA, LNP-S, PF, 30 MCG/0.3 ML DOSE VACCINE: CPT | Mod: PBBFAC | Performed by: INTERNAL MEDICINE

## 2024-11-14 NOTE — PATIENT INSTRUCTIONS
Abscess (Incision & Drainage)  An abscess is sometimes called a boil. It happens when bacteria get trapped under the skin and start to grow. Pus forms inside the abscess as the body responds to the bacteria. An abscess can happen with an insect bite, ingrown hair, blocked oil gland, pimple, cyst, or puncture wound.  Your healthcare provider has drained the pus from your abscess. If the abscess pocket was large, your healthcare provider may have put in gauze packing. Your provider will need to remove it on your next visit. He or she may also replace it at that time. You may not need antibiotics to treat a simple abscess, unless the infection is spreading into the skin around the wound (cellulitis).  The wound will take about 1 to 2 weeks to heal, depending on the size of the abscess. Healthy tissue will grow from the bottom and sides of the opening until it seals over.  Home care  These tips can help your wound heal:  · The wound may drain for the first 2 days. Cover the wound with a clean dry dressing. Change the dressing if it becomes soaked with blood or pus.  · If a gauze packing was placed inside the abscess pocket, you may be told to remove it yourself. You may do this in the shower. Once the packing is removed, you should wash the area in the shower, or clean the area as directed by your provider. Continue to do this until the skin opening has closed. Make sure you wash your hands after changing the packing or cleaning the wound.  · If you were prescribed antibiotics, take them as directed until they are all gone.  · You may use acetaminophen or ibuprofen to control pain, unless another pain medicine was prescribed. If you have liver disease or ever had a stomach ulcer, talk with your doctor before using these medicines.  Follow-up care  Follow up with your healthcare provider, or as advised. If a gauze packing was put in your wound, it should be removed in 1 to 2 days. Check your wound every day for any  signs that the infection is getting worse. The signs are listed below.  When to seek medical advice  Call your healthcare provider right away if any of these occur:  · Increasing redness or swelling  · Red streaks in the skin leading away from the wound  · Increasing local pain or swelling  · Continued pus draining from the wound 2 days after treatment  · Fever of 100.4ºF (38ºC) or higher, or as directed by your healthcare provider  · Boil returns when you are at home  Date Last Reviewed: 9/1/2016  © 5141-5364 [employer]. [address] [place] [id number]. All rights reserved. This information is not intended as a substitute for professional medical care. Always follow your healthcare professional's instructions.         29-Dec-2024 Attending Only